# Patient Record
Sex: FEMALE | Race: BLACK OR AFRICAN AMERICAN | NOT HISPANIC OR LATINO | URBAN - METROPOLITAN AREA
[De-identification: names, ages, dates, MRNs, and addresses within clinical notes are randomized per-mention and may not be internally consistent; named-entity substitution may affect disease eponyms.]

---

## 2020-07-31 ENCOUNTER — OFFICE VISIT (OUTPATIENT)
Dept: URBAN - METROPOLITAN AREA CLINIC 92 | Facility: CLINIC | Age: 70
End: 2020-07-31
Payer: MEDICARE

## 2020-07-31 VITALS
HEIGHT: 66 IN | BODY MASS INDEX: 24.59 KG/M2 | SYSTOLIC BLOOD PRESSURE: 120 MMHG | TEMPERATURE: 96.9 F | DIASTOLIC BLOOD PRESSURE: 77 MMHG | WEIGHT: 153 LBS | HEART RATE: 57 BPM

## 2020-07-31 DIAGNOSIS — K29.70 HELICOBACTER POSITIVE GASTRITIS: ICD-10-CM

## 2020-07-31 DIAGNOSIS — K57.90 DIVERTICULOSIS: ICD-10-CM

## 2020-07-31 DIAGNOSIS — Z80.0 FAMILY HISTORY OF COLON CANCER: ICD-10-CM

## 2020-07-31 DIAGNOSIS — Z86.010 HISTORY OF COLON POLYPS: ICD-10-CM

## 2020-07-31 PROCEDURE — G8427 DOCREV CUR MEDS BY ELIG CLIN: HCPCS | Performed by: INTERNAL MEDICINE

## 2020-07-31 PROCEDURE — 99214 OFFICE O/P EST MOD 30 MIN: CPT | Performed by: INTERNAL MEDICINE

## 2020-07-31 PROCEDURE — 1036F TOBACCO NON-USER: CPT | Performed by: INTERNAL MEDICINE

## 2020-07-31 PROCEDURE — 3017F COLORECTAL CA SCREEN DOC REV: CPT | Performed by: INTERNAL MEDICINE

## 2020-07-31 RX ORDER — LINACLOTIDE 145 UG/1
TAKE 1 CAPSULE (145 MCG) BY ORAL ROUTE ONCE DAILY ON AN EMPTY STOMACH AT LEAST 30 MINUTES BEFORE 1ST MEAL OF THE DAY FOR 90 DAYS CAPSULE, GELATIN COATED ORAL 1
Qty: 90 | Refills: 3 | Status: ACTIVE | COMMUNITY
Start: 2020-01-09 | End: 2021-01-03

## 2020-07-31 RX ORDER — HYDROCORTISONE ACETATE 25 MG/1
1 SUPPOSITORY SUPPOSITORY RECTAL TWICE A DAY
Qty: 60 | Refills: 0 | OUTPATIENT
Start: 2020-07-31

## 2020-07-31 RX ORDER — ZOLPIDEM TARTRATE 10 MG/1
TABLET, FILM COATED ORAL
Qty: 0 | Refills: 0 | Status: ACTIVE | COMMUNITY
Start: 1900-01-01

## 2020-07-31 RX ORDER — AMLODIPINE BESYLATE AND BENAZEPRIL HYDROCHLORIDE 5; 20 MG/1; MG/1
TAKE 1 CAPSULE BY ORAL ROUTE ONCE DAILY CAPSULE ORAL 1
Qty: 0 | Refills: 0 | Status: ACTIVE | COMMUNITY
Start: 1900-01-01

## 2020-07-31 RX ORDER — PANTOPRAZOLE SODIUM 40 MG/1
GRANULE, DELAYED RELEASE ORAL
Qty: 0 | Refills: 0 | Status: ACTIVE | COMMUNITY
Start: 1900-01-01

## 2020-07-31 RX ORDER — FAMOTIDINE 40 MG/1
TAKE 1 TABLET (40 MG) BY ORAL ROUTE ONCE DAILY AT BEDTIME FOR 90 DAYS TABLET, FILM COATED ORAL 1
Qty: 90 | Refills: 3 | Status: ACTIVE | COMMUNITY
Start: 2020-04-20 | End: 2021-04-15

## 2020-07-31 RX ORDER — DICLOFENAC SODIUM 75 MG/1
TAKE 1 TABLET (75 MG) BY ORAL ROUTE 2 TIMES PER DAY TABLET, DELAYED RELEASE ORAL 2
Qty: 0 | Refills: 0 | Status: ACTIVE | COMMUNITY
Start: 1900-01-01

## 2020-07-31 RX ORDER — LEVOTHYROXINE SODIUM 50 UG/1
TABLET ORAL
Qty: 0 | Refills: 0 | Status: ACTIVE | COMMUNITY
Start: 1900-01-01

## 2020-07-31 RX ORDER — METOPROLOL SUCCINATE 100 MG/1
TABLET, EXTENDED RELEASE ORAL
Qty: 0 | Refills: 0 | Status: ACTIVE | COMMUNITY
Start: 1900-01-01

## 2020-07-31 RX ORDER — MONTELUKAST SODIUM 10 MG/1
TAKE 1 TABLET (10 MG) BY ORAL ROUTE ONCE DAILY IN THE EVENING TABLET, FILM COATED ORAL 1
Qty: 0 | Refills: 0 | Status: ACTIVE | COMMUNITY
Start: 1900-01-01

## 2020-07-31 NOTE — HPI-TODAY'S VISIT:
This is a 70-year-old female who now presents for an urgent visit for evaluation of rectal pain and bleeding.  Patient reports having pain with defecation associated with BRBPR.  She has tried assessment  With partial improvement.  She reports having a daily bowel movement and denies any abdominal pain or N/V.  She has been followed by Cristel Amato.  She underwent a colonoscopy on 02/21/2020 that demonstrated 7 tubular adenomas, diverticulosis in the right colon, and external/internal hemorrhoids.  She has a family history with her mother, aunt, and grandfather who had colorectal malignancy.  She has intermittent dysphagia and underwent EGD on 02/21/2020 which showed normal esophagus which was empirically dilated.  She was found to have H pylori gastritis.  She was treated with triple therapy.

## 2020-07-31 NOTE — PHYSICAL EXAM GASTROINTESTINAL
Abdomen,  soft, nontender, nondistended,  no guarding or rigidity,  no masses palpable,  normal bowel sounds,  Liver and Spleen,  no hepatomegaly present,  no hepatosplenomegaly,  liver nontender,  spleen not palpable,  Self Exam: Abdomen soft, non-tender to palpatation, non-distended.  CHEKO - non thrombosed external hemorrhoids.  There is a palpable fissure laterally without mass.  There is no blood on the glove.

## 2020-08-03 ENCOUNTER — TELEPHONE ENCOUNTER (OUTPATIENT)
Dept: URBAN - METROPOLITAN AREA CLINIC 92 | Facility: CLINIC | Age: 70
End: 2020-08-03

## 2020-08-26 ENCOUNTER — OFFICE VISIT (OUTPATIENT)
Dept: URBAN - METROPOLITAN AREA CLINIC 92 | Facility: CLINIC | Age: 70
End: 2020-08-26
Payer: MEDICARE

## 2020-08-26 VITALS
WEIGHT: 154.6 LBS | TEMPERATURE: 94.6 F | DIASTOLIC BLOOD PRESSURE: 82 MMHG | HEIGHT: 66 IN | BODY MASS INDEX: 24.85 KG/M2 | SYSTOLIC BLOOD PRESSURE: 133 MMHG | HEART RATE: 55 BPM

## 2020-08-26 DIAGNOSIS — Z80.0 FAMILY HISTORY OF COLON CANCER: ICD-10-CM

## 2020-08-26 DIAGNOSIS — K29.70 HELICOBACTER POSITIVE GASTRITIS: ICD-10-CM

## 2020-08-26 DIAGNOSIS — K57.90 DIVERTICULOSIS: ICD-10-CM

## 2020-08-26 DIAGNOSIS — K59.09 CHRONIC CONSTIPATION: ICD-10-CM

## 2020-08-26 DIAGNOSIS — Z86.010 HISTORY OF COLON POLYPS: ICD-10-CM

## 2020-08-26 DIAGNOSIS — K60.2 ANAL FISSURE: ICD-10-CM

## 2020-08-26 PROCEDURE — 3017F COLORECTAL CA SCREEN DOC REV: CPT | Performed by: PHYSICIAN ASSISTANT

## 2020-08-26 PROCEDURE — 99214 OFFICE O/P EST MOD 30 MIN: CPT | Performed by: PHYSICIAN ASSISTANT

## 2020-08-26 PROCEDURE — G8420 CALC BMI NORM PARAMETERS: HCPCS | Performed by: PHYSICIAN ASSISTANT

## 2020-08-26 PROCEDURE — G8427 DOCREV CUR MEDS BY ELIG CLIN: HCPCS | Performed by: PHYSICIAN ASSISTANT

## 2020-08-26 PROCEDURE — G9903 PT SCRN TBCO ID AS NON USER: HCPCS | Performed by: PHYSICIAN ASSISTANT

## 2020-08-26 RX ORDER — LEVOTHYROXINE SODIUM 50 UG/1
TABLET ORAL
Qty: 0 | Refills: 0 | Status: ACTIVE | COMMUNITY
Start: 1900-01-01

## 2020-08-26 RX ORDER — AMLODIPINE BESYLATE AND BENAZEPRIL HYDROCHLORIDE 5; 20 MG/1; MG/1
TAKE 1 CAPSULE BY ORAL ROUTE ONCE DAILY CAPSULE ORAL 1
Qty: 0 | Refills: 0 | Status: ACTIVE | COMMUNITY
Start: 1900-01-01

## 2020-08-26 RX ORDER — MONTELUKAST SODIUM 10 MG/1
TAKE 1 TABLET (10 MG) BY ORAL ROUTE ONCE DAILY IN THE EVENING TABLET, FILM COATED ORAL 1
Qty: 0 | Refills: 0 | Status: ACTIVE | COMMUNITY
Start: 1900-01-01

## 2020-08-26 RX ORDER — LUBIPROSTONE 24 UG/1
1 CAPSULE WITH FOOD AND WATER CAPSULE, GELATIN COATED ORAL TWICE A DAY
Qty: 60 CAPSULE | Refills: 1 | OUTPATIENT
Start: 2020-08-26 | End: 2020-10-25

## 2020-08-26 RX ORDER — DICLOFENAC SODIUM 75 MG/1
TAKE 1 TABLET (75 MG) BY ORAL ROUTE 2 TIMES PER DAY TABLET, DELAYED RELEASE ORAL 2
Qty: 0 | Refills: 0 | Status: ACTIVE | COMMUNITY
Start: 1900-01-01

## 2020-08-26 RX ORDER — PANTOPRAZOLE SODIUM 40 MG/1
GRANULE, DELAYED RELEASE ORAL
Qty: 0 | Refills: 0 | Status: ACTIVE | COMMUNITY
Start: 1900-01-01

## 2020-08-26 RX ORDER — ZOLPIDEM TARTRATE 10 MG/1
TABLET, FILM COATED ORAL
Qty: 0 | Refills: 0 | Status: ACTIVE | COMMUNITY
Start: 1900-01-01

## 2020-08-26 RX ORDER — LINACLOTIDE 145 UG/1
TAKE 1 CAPSULE (145 MCG) BY ORAL ROUTE ONCE DAILY ON AN EMPTY STOMACH AT LEAST 30 MINUTES BEFORE 1ST MEAL OF THE DAY FOR 90 DAYS CAPSULE, GELATIN COATED ORAL 1
Qty: 90 | Refills: 3 | Status: ACTIVE | COMMUNITY
Start: 2020-01-09 | End: 2021-01-03

## 2020-08-26 RX ORDER — METOPROLOL SUCCINATE 100 MG/1
TABLET, EXTENDED RELEASE ORAL
Qty: 0 | Refills: 0 | Status: ACTIVE | COMMUNITY
Start: 1900-01-01

## 2020-08-26 RX ORDER — FAMOTIDINE 40 MG/1
TAKE 1 TABLET (40 MG) BY ORAL ROUTE ONCE DAILY AT BEDTIME FOR 90 DAYS TABLET, FILM COATED ORAL 1
Qty: 90 | Refills: 3 | Status: ACTIVE | COMMUNITY
Start: 2020-04-20 | End: 2021-04-15

## 2020-08-26 RX ORDER — HYDROCORTISONE ACETATE 25 MG/1
1 SUPPOSITORY SUPPOSITORY RECTAL TWICE A DAY
Qty: 60 | Refills: 0 | Status: ACTIVE | COMMUNITY
Start: 2020-07-31

## 2020-08-26 NOTE — HPI-TODAY'S VISIT:
This is a 70yoF who now presents for f/u of rectal pain and bleeding.   Seen emergently by Dr. Coates 3 weeks ago and dx with fissure, given lido/nifedipine and stool softners.  She feels some incomplete evacuation despite miralax and thinks this is aggravating her pain. Pain has improved but only slightly. Bleeding still but not with every BM.   She underwent a colonoscopy on 02/21/2020 that demonstrated 7 tubular adenomas, diverticulosis in the right colon, and external/internal hemorrhoids.  She has a family history with her mother, aunt, and grandfather who had colorectal malignancy.  She has intermittent dysphagia and underwent EGD on 02/21/2020 which showed normal esophagus which was empirically dilated.  She was found to have H pylori gastritis.  She was treated with triple therapy. She is taking her PPI every AM and heartburn has resolved.

## 2020-09-25 ENCOUNTER — OFFICE VISIT (OUTPATIENT)
Dept: URBAN - METROPOLITAN AREA TELEHEALTH 2 | Facility: TELEHEALTH | Age: 70
End: 2020-09-25
Payer: MEDICARE

## 2020-09-25 DIAGNOSIS — K60.2 ANAL FISSURE: ICD-10-CM

## 2020-09-25 DIAGNOSIS — Z86.010 HISTORY OF COLON POLYPS: ICD-10-CM

## 2020-09-25 DIAGNOSIS — K57.30 ACQUIRED DIVERTICULOSIS OF COLON: ICD-10-CM

## 2020-09-25 DIAGNOSIS — K59.09 CHRONIC CONSTIPATION: ICD-10-CM

## 2020-09-25 DIAGNOSIS — K57.90 DIVERTICULOSIS: ICD-10-CM

## 2020-09-25 DIAGNOSIS — Z80.0 FAMILY HISTORY OF COLON CANCER: ICD-10-CM

## 2020-09-25 DIAGNOSIS — K29.70 HELICOBACTER POSITIVE GASTRITIS: ICD-10-CM

## 2020-09-25 PROCEDURE — G8427 DOCREV CUR MEDS BY ELIG CLIN: HCPCS | Performed by: PHYSICIAN ASSISTANT

## 2020-09-25 PROCEDURE — G8420 CALC BMI NORM PARAMETERS: HCPCS | Performed by: PHYSICIAN ASSISTANT

## 2020-09-25 PROCEDURE — 3017F COLORECTAL CA SCREEN DOC REV: CPT | Performed by: PHYSICIAN ASSISTANT

## 2020-09-25 PROCEDURE — 1036F TOBACCO NON-USER: CPT | Performed by: PHYSICIAN ASSISTANT

## 2020-09-25 PROCEDURE — G9903 PT SCRN TBCO ID AS NON USER: HCPCS | Performed by: PHYSICIAN ASSISTANT

## 2020-09-25 PROCEDURE — 99214 OFFICE O/P EST MOD 30 MIN: CPT | Performed by: PHYSICIAN ASSISTANT

## 2020-09-25 RX ORDER — LEVOTHYROXINE SODIUM 50 UG/1
TABLET ORAL
Qty: 0 | Refills: 0 | Status: ACTIVE | COMMUNITY
Start: 1900-01-01

## 2020-09-25 RX ORDER — PANTOPRAZOLE SODIUM 40 MG/1
GRANULE, DELAYED RELEASE ORAL
Qty: 0 | Refills: 0 | Status: ACTIVE | COMMUNITY
Start: 1900-01-01

## 2020-09-25 RX ORDER — LUBIPROSTONE 24 UG/1
1 CAPSULE WITH FOOD AND WATER CAPSULE, GELATIN COATED ORAL TWICE A DAY
Qty: 60 CAPSULE | Refills: 1 | Status: DISCONTINUED | COMMUNITY
Start: 2020-08-26 | End: 2020-10-25

## 2020-09-25 RX ORDER — ZOLPIDEM TARTRATE 10 MG/1
TABLET, FILM COATED ORAL
Qty: 0 | Refills: 0 | Status: ACTIVE | COMMUNITY
Start: 1900-01-01

## 2020-09-25 RX ORDER — METOPROLOL SUCCINATE 100 MG/1
TABLET, EXTENDED RELEASE ORAL
Qty: 0 | Refills: 0 | Status: ACTIVE | COMMUNITY
Start: 1900-01-01

## 2020-09-25 RX ORDER — DICLOFENAC SODIUM 75 MG/1
TAKE 1 TABLET (75 MG) BY ORAL ROUTE 2 TIMES PER DAY TABLET, DELAYED RELEASE ORAL 2
Qty: 0 | Refills: 0 | Status: ACTIVE | COMMUNITY
Start: 1900-01-01

## 2020-09-25 RX ORDER — PLECANATIDE 3 MG/1
1 TABLET TABLET ORAL ONCE A DAY
Qty: 90 | Refills: 3 | OUTPATIENT
Start: 2020-09-25 | End: 2021-09-20

## 2020-09-25 RX ORDER — AMLODIPINE BESYLATE AND BENAZEPRIL HYDROCHLORIDE 5; 20 MG/1; MG/1
TAKE 1 CAPSULE BY ORAL ROUTE ONCE DAILY CAPSULE ORAL 1
Qty: 0 | Refills: 0 | Status: ACTIVE | COMMUNITY
Start: 1900-01-01

## 2020-09-25 RX ORDER — FAMOTIDINE 40 MG/1
TAKE 1 TABLET (40 MG) BY ORAL ROUTE ONCE DAILY AT BEDTIME FOR 90 DAYS TABLET, FILM COATED ORAL 1
Qty: 90 | Refills: 3 | Status: ACTIVE | COMMUNITY
Start: 2020-04-20 | End: 2021-04-15

## 2020-09-25 RX ORDER — LINACLOTIDE 145 UG/1
TAKE 1 CAPSULE (145 MCG) BY ORAL ROUTE ONCE DAILY ON AN EMPTY STOMACH AT LEAST 30 MINUTES BEFORE 1ST MEAL OF THE DAY FOR 90 DAYS CAPSULE, GELATIN COATED ORAL 1
Qty: 90 | Refills: 3 | Status: DISCONTINUED | COMMUNITY
Start: 2020-01-09 | End: 2021-01-03

## 2020-09-25 RX ORDER — MONTELUKAST SODIUM 10 MG/1
TAKE 1 TABLET (10 MG) BY ORAL ROUTE ONCE DAILY IN THE EVENING TABLET, FILM COATED ORAL 1
Qty: 0 | Refills: 0 | Status: ACTIVE | COMMUNITY
Start: 1900-01-01

## 2020-09-25 RX ORDER — HYDROCORTISONE ACETATE 25 MG/1
1 SUPPOSITORY SUPPOSITORY RECTAL TWICE A DAY
Qty: 60 | Refills: 0 | Status: ACTIVE | COMMUNITY
Start: 2020-07-31

## 2020-09-25 NOTE — HPI-TODAY'S VISIT:
This is a 70yoF who now presents for f/u of rectal pain and bleeding.      Seen emergently by Dr. Coates 6 weeks ago and dx with fissure, given lido/nifedipine and stool softners.  Pain and bleeding had improved sign--pain only with BM and more mild and bleeding only with BM and less often. Continues to use cream and sitz baths. Linzess 145 caused diarrhea and cramps and amitiza 24mcg BID did not help. Using miralax BID but still some straining and uses linzess when feels backed up.  She underwent a colonoscopy on 02/21/2020 that demonstrated 7 tubular adenomas, diverticulosis in the right colon, and external/internal hemorrhoids.  She has a family history with her mother, aunt, and grandfather who had colorectal malignancy.  She has intermittent dysphagia and underwent EGD on 02/21/2020 which showed normal esophagus which was empirically dilated.  She was found to have H pylori gastritis.  She was treated with triple therapy. She is taking her PPI every AM and heartburn has resolved.

## 2020-11-04 ENCOUNTER — OFFICE VISIT (OUTPATIENT)
Dept: URBAN - METROPOLITAN AREA CLINIC 92 | Facility: CLINIC | Age: 70
End: 2020-11-04

## 2020-11-30 ENCOUNTER — OFFICE VISIT (OUTPATIENT)
Dept: URBAN - METROPOLITAN AREA TELEHEALTH 2 | Facility: TELEHEALTH | Age: 70
End: 2020-11-30
Payer: MEDICARE

## 2020-11-30 DIAGNOSIS — K29.60 ADENOPAPILLOMATOSIS GASTRICA: ICD-10-CM

## 2020-11-30 DIAGNOSIS — K57.90 DIVERTICULOSIS: ICD-10-CM

## 2020-11-30 DIAGNOSIS — Z80.0 FAMILY HISTORY OF COLON CANCER: ICD-10-CM

## 2020-11-30 DIAGNOSIS — K60.2 ANAL FISSURE: ICD-10-CM

## 2020-11-30 DIAGNOSIS — K29.70 HELICOBACTER POSITIVE GASTRITIS: ICD-10-CM

## 2020-11-30 DIAGNOSIS — Z86.010 HISTORY OF COLON POLYPS: ICD-10-CM

## 2020-11-30 DIAGNOSIS — K59.09 CHRONIC CONSTIPATION: ICD-10-CM

## 2020-11-30 PROCEDURE — G9903 PT SCRN TBCO ID AS NON USER: HCPCS | Performed by: INTERNAL MEDICINE

## 2020-11-30 PROCEDURE — G8427 DOCREV CUR MEDS BY ELIG CLIN: HCPCS | Performed by: INTERNAL MEDICINE

## 2020-11-30 PROCEDURE — 1036F TOBACCO NON-USER: CPT | Performed by: INTERNAL MEDICINE

## 2020-11-30 PROCEDURE — G8420 CALC BMI NORM PARAMETERS: HCPCS | Performed by: INTERNAL MEDICINE

## 2020-11-30 PROCEDURE — G8482 FLU IMMUNIZE ORDER/ADMIN: HCPCS | Performed by: INTERNAL MEDICINE

## 2020-11-30 PROCEDURE — 99213 OFFICE O/P EST LOW 20 MIN: CPT | Performed by: INTERNAL MEDICINE

## 2020-11-30 RX ORDER — METOPROLOL SUCCINATE 100 MG/1
TABLET, EXTENDED RELEASE ORAL
Qty: 0 | Refills: 0 | Status: ACTIVE | COMMUNITY
Start: 1900-01-01

## 2020-11-30 RX ORDER — ZOLPIDEM TARTRATE 10 MG/1
TABLET, FILM COATED ORAL
Qty: 0 | Refills: 0 | Status: ACTIVE | COMMUNITY
Start: 1900-01-01

## 2020-11-30 RX ORDER — LEVOTHYROXINE SODIUM 50 UG/1
TABLET ORAL
Qty: 0 | Refills: 0 | Status: ACTIVE | COMMUNITY
Start: 1900-01-01

## 2020-11-30 RX ORDER — HYDROCORTISONE ACETATE 25 MG/1
1 SUPPOSITORY SUPPOSITORY RECTAL TWICE A DAY
Qty: 60 | Refills: 0 | Status: ACTIVE | COMMUNITY
Start: 2020-07-31

## 2020-11-30 RX ORDER — PLECANATIDE 3 MG/1
1 TABLET TABLET ORAL ONCE A DAY
Qty: 90 | Refills: 3 | OUTPATIENT

## 2020-11-30 RX ORDER — AMLODIPINE BESYLATE AND BENAZEPRIL HYDROCHLORIDE 5; 20 MG/1; MG/1
TAKE 1 CAPSULE BY ORAL ROUTE ONCE DAILY CAPSULE ORAL 1
Qty: 0 | Refills: 0 | Status: ACTIVE | COMMUNITY
Start: 1900-01-01

## 2020-11-30 RX ORDER — MONTELUKAST SODIUM 10 MG/1
TAKE 1 TABLET (10 MG) BY ORAL ROUTE ONCE DAILY IN THE EVENING TABLET, FILM COATED ORAL 1
Qty: 0 | Refills: 0 | Status: ACTIVE | COMMUNITY
Start: 1900-01-01

## 2020-11-30 RX ORDER — PLECANATIDE 3 MG/1
1 TABLET TABLET ORAL ONCE A DAY
Qty: 90 | Refills: 3 | Status: ACTIVE | COMMUNITY
Start: 2020-09-25 | End: 2021-09-20

## 2020-11-30 RX ORDER — FAMOTIDINE 40 MG/1
TAKE 1 TABLET (40 MG) BY ORAL ROUTE ONCE DAILY AT BEDTIME FOR 90 DAYS TABLET, FILM COATED ORAL 1
Qty: 90 | Refills: 3 | Status: ACTIVE | COMMUNITY
Start: 2020-04-20 | End: 2021-04-15

## 2020-11-30 RX ORDER — PANTOPRAZOLE SODIUM 40 MG/1
GRANULE, DELAYED RELEASE ORAL
Qty: 0 | Refills: 0 | Status: ACTIVE | COMMUNITY
Start: 1900-01-01

## 2020-11-30 RX ORDER — DICLOFENAC SODIUM 75 MG/1
TAKE 1 TABLET (75 MG) BY ORAL ROUTE 2 TIMES PER DAY TABLET, DELAYED RELEASE ORAL 2
Qty: 0 | Refills: 0 | Status: ACTIVE | COMMUNITY
Start: 1900-01-01

## 2020-11-30 NOTE — HPI-TODAY'S VISIT:
This is a 70yoF who now presents for f/u of rectal pain and bleeding.        Seen emergently by Dr. Coates 3m ago and dx with fissure, given lido/nifedipine and stool softners.  Pain has resolved and bleeding very rare, only with straining.  Linzess 145 caused diarrhea and cramps and amitiza 24mcg BID did not help. Using miralax 1-BID and prn laxatives. Given trulane at last OV but needs PA.   She underwent a colonoscopy on 02/21/2020 that demonstrated 7 tubular adenomas, diverticulosis in the right colon, and external/internal hemorrhoids.  She has a family history with her mother, aunt, and grandfather who had colorectal malignancy.  She has intermittent dysphagia and underwent EGD on 02/21/2020 which showed normal esophagus which was empirically dilated.  She was found to have H pylori gastritis.  She was treated with triple therapy. She is taking her PPI every AM and heartburn has resolved.

## 2022-03-23 ENCOUNTER — WEB ENCOUNTER (OUTPATIENT)
Dept: URBAN - METROPOLITAN AREA CLINIC 92 | Facility: CLINIC | Age: 72
End: 2022-03-23

## 2022-03-23 ENCOUNTER — OFFICE VISIT (OUTPATIENT)
Dept: URBAN - METROPOLITAN AREA CLINIC 92 | Facility: CLINIC | Age: 72
End: 2022-03-23
Payer: MEDICARE

## 2022-03-23 ENCOUNTER — TELEPHONE ENCOUNTER (OUTPATIENT)
Dept: URBAN - METROPOLITAN AREA CLINIC 92 | Facility: CLINIC | Age: 72
End: 2022-03-23

## 2022-03-23 VITALS
HEIGHT: 66 IN | DIASTOLIC BLOOD PRESSURE: 85 MMHG | WEIGHT: 150 LBS | SYSTOLIC BLOOD PRESSURE: 131 MMHG | BODY MASS INDEX: 24.11 KG/M2 | HEART RATE: 53 BPM | TEMPERATURE: 96.1 F

## 2022-03-23 DIAGNOSIS — K60.2 ANAL FISSURE: ICD-10-CM

## 2022-03-23 DIAGNOSIS — Z86.010 HISTORY OF COLON POLYPS: ICD-10-CM

## 2022-03-23 DIAGNOSIS — K62.5 RECTAL BLEEDING: ICD-10-CM

## 2022-03-23 DIAGNOSIS — K59.09 CHRONIC CONSTIPATION: ICD-10-CM

## 2022-03-23 DIAGNOSIS — K57.90 DIVERTICULOSIS: ICD-10-CM

## 2022-03-23 DIAGNOSIS — Z80.0 FAMILY HISTORY OF COLON CANCER: ICD-10-CM

## 2022-03-23 DIAGNOSIS — K29.70 HELICOBACTER POSITIVE GASTRITIS: ICD-10-CM

## 2022-03-23 PROCEDURE — 99214 OFFICE O/P EST MOD 30 MIN: CPT | Performed by: PHYSICIAN ASSISTANT

## 2022-03-23 RX ORDER — HYDROCORTISONE ACETATE 25 MG/1
1 SUPPOSITORY SUPPOSITORY RECTAL TWICE A DAY
Qty: 60 | Refills: 0 | Status: ON HOLD | COMMUNITY
Start: 2020-07-31

## 2022-03-23 RX ORDER — DICLOFENAC SODIUM 75 MG/1
TAKE 1 TABLET (75 MG) BY ORAL ROUTE 2 TIMES PER DAY TABLET, DELAYED RELEASE ORAL 2
Qty: 0 | Refills: 0 | Status: ON HOLD | COMMUNITY
Start: 1900-01-01

## 2022-03-23 RX ORDER — PLECANATIDE 3 MG/1
1 TABLET TABLET ORAL ONCE A DAY
Qty: 90 | Refills: 3 | OUTPATIENT

## 2022-03-23 RX ORDER — LEVOTHYROXINE SODIUM 50 UG/1
TABLET ORAL
Qty: 0 | Refills: 0 | Status: ACTIVE | COMMUNITY
Start: 1900-01-01

## 2022-03-23 RX ORDER — METOPROLOL SUCCINATE 100 MG/1
TABLET, EXTENDED RELEASE ORAL
Qty: 0 | Refills: 0 | Status: ACTIVE | COMMUNITY
Start: 1900-01-01

## 2022-03-23 RX ORDER — PANTOPRAZOLE SODIUM 40 MG/1
GRANULE, DELAYED RELEASE ORAL
Qty: 0 | Refills: 0 | Status: ACTIVE | COMMUNITY
Start: 1900-01-01

## 2022-03-23 RX ORDER — PLECANATIDE 3 MG/1
1 TABLET TABLET ORAL ONCE A DAY
Qty: 90 | Refills: 3 | Status: ON HOLD | COMMUNITY

## 2022-03-23 RX ORDER — AMLODIPINE BESYLATE AND BENAZEPRIL HYDROCHLORIDE 5; 20 MG/1; MG/1
TAKE 1 CAPSULE BY ORAL ROUTE ONCE DAILY CAPSULE ORAL 1
Qty: 0 | Refills: 0 | Status: ACTIVE | COMMUNITY
Start: 1900-01-01

## 2022-03-23 RX ORDER — ZOLPIDEM TARTRATE 10 MG/1
TABLET, FILM COATED ORAL
Qty: 0 | Refills: 0 | Status: ON HOLD | COMMUNITY
Start: 1900-01-01

## 2022-03-23 RX ORDER — MONTELUKAST SODIUM 10 MG/1
TAKE 1 TABLET (10 MG) BY ORAL ROUTE ONCE DAILY IN THE EVENING TABLET, FILM COATED ORAL 1
Qty: 0 | Refills: 0 | Status: ACTIVE | COMMUNITY
Start: 1900-01-01

## 2022-03-23 NOTE — EXAM-PHYSICAL EXAM
rectal: large external hemm, not bleeding + pain on posterior anal area m/l 2' fissue, exam not complete 2' pain

## 2022-03-23 NOTE — HPI-TODAY'S VISIT:
This is a 72yoF who now presents for eval of rectal bleeding, last seen 8/2020.    Notes intermittent rectal bleeding with associated rectal soreness X 1 year. Bleeding was daily in the winter of 2021, then resolved for months and now has been recurring multiple times a week. She  has a hx of CIC and is currently using miralax 3 days/week and has BMs daily but admits to straining at times and some hard stools. Linzess caused cramps and diarrhea and amitiza did not help. Never tried rx sent for trulance.      She was dx with an anal fissure in 2020, given lido/nifedipine and stool softners.   She underwent a colonoscopy on 02/21/2020 that demonstrated 7 tubular adenomas, diverticulosis in the right colon, and external/internal hemorrhoids.  She has a family history with her mother, aunt, and grandfather who had colorectal malignancy.  She has intermittent dysphagia and underwent EGD on 02/21/2020 which showed normal esophagus which was empirically dilated.  She was found to have H pylori gastritis.  She was treated with triple therapy but eradication testing was not completed. She is taking her PPI every AM and heartburn has resolved bust still has some regurgitation, worse with leaning and pending over. No dysphagia

## 2022-04-11 ENCOUNTER — LAB OUTSIDE AN ENCOUNTER (OUTPATIENT)
Dept: URBAN - METROPOLITAN AREA CLINIC 92 | Facility: CLINIC | Age: 72
End: 2022-04-11

## 2022-04-12 LAB — H PYLORI BREATH TEST: NEGATIVE

## 2022-07-21 ENCOUNTER — WEB ENCOUNTER (OUTPATIENT)
Dept: URBAN - METROPOLITAN AREA CLINIC 92 | Facility: CLINIC | Age: 72
End: 2022-07-21

## 2022-07-21 ENCOUNTER — OFFICE VISIT (OUTPATIENT)
Dept: URBAN - METROPOLITAN AREA CLINIC 92 | Facility: CLINIC | Age: 72
End: 2022-07-21
Payer: MEDICARE

## 2022-07-21 VITALS
HEART RATE: 52 BPM | WEIGHT: 151 LBS | TEMPERATURE: 95.7 F | DIASTOLIC BLOOD PRESSURE: 85 MMHG | BODY MASS INDEX: 24.27 KG/M2 | SYSTOLIC BLOOD PRESSURE: 141 MMHG | HEIGHT: 66 IN

## 2022-07-21 DIAGNOSIS — K57.50 DIVERTICULOSIS OF BOTH SMALL AND LARGE INTESTINE WITHOUT PERFORATION OR ABSCESS: ICD-10-CM

## 2022-07-21 DIAGNOSIS — K60.2 ANAL FISSURE: ICD-10-CM

## 2022-07-21 DIAGNOSIS — Z86.010 HISTORY OF COLON POLYPS: ICD-10-CM

## 2022-07-21 DIAGNOSIS — Z80.0 FAMILY HISTORY OF COLON CANCER: ICD-10-CM

## 2022-07-21 PROBLEM — 236069009: Status: ACTIVE | Noted: 2020-08-26

## 2022-07-21 PROCEDURE — 99213 OFFICE O/P EST LOW 20 MIN: CPT | Performed by: PHYSICIAN ASSISTANT

## 2022-07-21 RX ORDER — DICLOFENAC SODIUM 75 MG/1
TAKE 1 TABLET (75 MG) BY ORAL ROUTE 2 TIMES PER DAY TABLET, DELAYED RELEASE ORAL 2
Qty: 0 | Refills: 0 | Status: ON HOLD | COMMUNITY
Start: 1900-01-01

## 2022-07-21 RX ORDER — ZOLPIDEM TARTRATE 10 MG/1
TABLET, FILM COATED ORAL
Qty: 0 | Refills: 0 | Status: ON HOLD | COMMUNITY
Start: 1900-01-01

## 2022-07-21 RX ORDER — MONTELUKAST SODIUM 10 MG/1
TAKE 1 TABLET (10 MG) BY ORAL ROUTE ONCE DAILY IN THE EVENING TABLET, FILM COATED ORAL 1
Qty: 0 | Refills: 0 | Status: ACTIVE | COMMUNITY
Start: 1900-01-01

## 2022-07-21 RX ORDER — HYDROCORTISONE ACETATE 25 MG/1
1 SUPPOSITORY SUPPOSITORY RECTAL TWICE A DAY
Qty: 60 | Refills: 0 | Status: ON HOLD | COMMUNITY
Start: 2020-07-31

## 2022-07-21 RX ORDER — METOPROLOL SUCCINATE 100 MG/1
TABLET, EXTENDED RELEASE ORAL
Qty: 0 | Refills: 0 | Status: ACTIVE | COMMUNITY
Start: 1900-01-01

## 2022-07-21 RX ORDER — AMLODIPINE BESYLATE AND BENAZEPRIL HYDROCHLORIDE 5; 20 MG/1; MG/1
TAKE 1 CAPSULE BY ORAL ROUTE ONCE DAILY CAPSULE ORAL 1
Qty: 0 | Refills: 0 | Status: ACTIVE | COMMUNITY
Start: 1900-01-01

## 2022-07-21 RX ORDER — LEVOTHYROXINE SODIUM 50 UG/1
TABLET ORAL
Qty: 0 | Refills: 0 | Status: ACTIVE | COMMUNITY
Start: 1900-01-01

## 2022-07-21 RX ORDER — PANTOPRAZOLE SODIUM 40 MG/1
GRANULE, DELAYED RELEASE ORAL
Qty: 0 | Refills: 0 | Status: ACTIVE | COMMUNITY
Start: 1900-01-01

## 2022-07-21 NOTE — PHYSICAL EXAM GASTROINTESTINAL
Abdomen, LLQ tenderness  soft, nondistended,  no guarding or rigidity,  no masses palpable,  normal bowel sounds Liver and Spleen, no hepatosplenomegaly Rectal deferred

## 2022-07-21 NOTE — HPI-TODAY'S VISIT:
Patient Instructions by Michelle Kay MD at 9/4/2019  9:10 AM     Author: Michelle Kay MD Service: -- Author Type: Physician    Filed: 9/4/2019  9:47 AM Encounter Date: 9/4/2019 Status: Signed    : Michelle Kay MD (Physician)         9/4/2019  Wt Readings from Last 1 Encounters:   09/04/19 21 lb (9.526 kg) (66 %, Z= 0.42)*     * Growth percentiles are based on WHO (Girls, 0-2 years) data.       Acetaminophen Dosing Instructions  (May take every 4-6 hours)      WEIGHT   AGE Infant/Children's  160mg/5ml Children's   Chewable Tabs  80 mg each Kenneth Strength  Chewable Tabs  160 mg     Milliliter (ml) Soft Chew Tabs Chewable Tabs   6-11 lbs 0-3 months 1.25 ml     12-17 lbs 4-11 months 2.5 ml     18-23 lbs 12-23 months 3.75 ml     24-35 lbs 2-3 years 5 ml 2 tabs    36-47 lbs 4-5 years 7.5 ml 3 tabs    48-59 lbs 6-8 years 10 ml 4 tabs 2 tabs   60-71 lbs 9-10 years 12.5 ml 5 tabs 2.5 tabs   72-95 lbs 11 years 15 ml 6 tabs 3 tabs   96 lbs and over 12 years   4 tabs     Ibuprofen Dosing Instructions- Liquid  (May take every 6-8 hours)      WEIGHT   AGE Concentrated Drops   50 mg/1.25 ml Infant/Children's   100 mg/5ml     Dropperful Milliliter (ml)   12-17 lbs 6- 11 months 1 (1.25 ml)    18-23 lbs 12-23 months 1 1/2 (1.875 ml)    24-35 lbs 2-3 years  5 ml   36-47 lbs 4-5 years  7.5 ml   48-59 lbs 6-8 years  10 ml   60-71 lbs 9-10 years  12.5 ml   72-95 lbs 11 years  15 ml       Ibuprofen Dosing Instructions- Tablets/Caplets  (May take every 6-8 hours)    WEIGHT AGE Children's   Chewable Tabs   50 mg Kenneth Strength   Chewable Tabs   100 mg Kenneth Strength   Caplets    100 mg     Tablet Tablet Caplet   24-35 lbs 2-3 years 2 tabs     36-47 lbs 4-5 years 3 tabs     48-59 lbs 6-8 years 4 tabs 2 tabs 2 caps   60-71 lbs 9-10 years 5 tabs 2.5 tabs 2.5 caps   72-95 lbs 11 years 6 tabs 3 tabs 3 caps           Patient Education             Bright Futures Parent Handout   12 Month Visit  Here are some  This is a 72yoF who now presents for follow-up.    She was seen in April with intermittent rectal bleeding with associated rectal soreness X 1 year. She  has a hx of CIC and advised to start daily miralax 2' straining . She had a fissure on exam, given nifedipine/lido and referred to Dr. Orr who was unable to complete anoscopy 2' patients pain. He continued her on nifedipine, and she is still on this with some relief for the pain, but she is having intermittent bleeding and rectal discomfort, improved.  Of note, linzess caused cramps and diarrhea and amitiza did not help. Did not try Trulance due to insurance issues. She has 1-2 BM daily and is currently taking Dulcolax daily which helps some but still having constipation and straining. She is having to pushing on her buttocks to get the stool out.   She underwent a colonoscopy on 02/21/2020 that demonstrated 7 tubular adenomas, diverticulosis in the right colon, and external/internal hemorrhoids.  She has a family history with her mother, aunt, and grandfather who had colorectal malignancy.  She has intermittent dysphagia and underwent EGD on 02/21/2020 which showed normal esophagus which was empirically dilated.  She was found to have H pylori gastritis.  She was treated with triple therapy and had eradication testing 4/2022 which confirmed eradication.  She is taking her PPI every AM and denies GERD, dysphagia, odynophagia and N/V. suggestions from [employer] experts that may be of value to your family     Family Support    Try not to hit, spank, or yell at your child.    Keep rules for your child short and simple.    Use short time-outs when your child is behaving poorly.    Praise your child for good behavior.    Distract your child with something he likes during bad behavior.    Play with and read to your child often.    Make sure everyone who cares for your child gives healthy foods, avoids sweets, and uses the same rules for discipline.    Make sure places your child stays are safe.    Think about joining a toddler playgroup or taking a parenting class.    Take time for yourself and your partner.    Keep in contact with family and friends.  Establishing Routines    Your child should have at least one nap. Space it to make sure your child is tired for bed.    Make the hour before bedtime loving and calm.    Have a simple bedtime routine that includes a book.    Avoid having your child watch TV and videos, and never watch anything scary.    Be aware that fear of strangers is normal and peaks at this age.    Respect your marcy fears and have strangers approach slowly.    Avoid watching TV during family time.    Start family traditions such as reading or going for a walk together. Feeding Your Child    Have your child eat during family mealtime.    Be patient with your child as she learns to eat without help.    Encourage your child to feed herself.    Give 3 meals and 2-3 snacks spaced evenly over the day to avoid tantrums.    Make sure caregivers follow the same ideas and routines for feeding.    Use a small plate and cup for eating and drinking.    Provide healthy foods for meals and snacks.    Let your child decide what and how much to eat.    End the feeding when the child stops eating.    Avoid small, hard foods that can cause choking--nuts, popcorn, hot dogs, grapes, and hard, raw veggies.  Safety    Have your marcy car safety  seat rear-facing until your child is 2 years of age or until she reaches the highest weight or height allowed by the car safety seats .    Lock away poisons, medications, and lawn and cleaning supplies. Call Poison Help (1-814.438.3862) if your child eats nonfoods.    Keep small objects, balloons, and plastic bags away from your child.    Place diaz at the top and bottom of stairs and guards on windows on the second floor and higher. Keep furniture away from windows.    Lock away knives and scissors.    Only leave your toddler with a mature adult.    Near or in water, keep your child close enough to touch.   Make sure to empty buckets, pools, and tubs when done.    Never have a gun in the home. If you must have a gun, store it unloaded and locked with the ammunition locked separately from the gun.  Finding a Dentist    Take your child for a first dental visit by 12 months.    Brush your carl teeth twice each day.    With water only, use a soft toothbrush.    If using a bottle, offer only water.  What to Expect at Your Carl 15 Month Visit  We will talk about    Your carl speech and feelings    Getting a good nights sleep    Keeping your home safe for your child    Temper tantrums and discipline    Caring for your carl teeth  ________________________________  Poison Help: 1-644.347.4639  Child safety seat inspection: 8-341-ZYLNMZHYH; seatcheck.org

## 2022-07-25 PROBLEM — 30037006: Status: ACTIVE | Noted: 2020-08-26

## 2023-02-06 PROBLEM — 312824007 FAMILY HISTORY OF CANCER OF COLON (SITUATION): Status: ACTIVE | Noted: 2020-08-26

## 2023-02-15 ENCOUNTER — TELEPHONE ENCOUNTER (OUTPATIENT)
Dept: URBAN - METROPOLITAN AREA CLINIC 5 | Facility: CLINIC | Age: 73
End: 2023-02-15

## 2023-02-15 PROBLEM — 398050005 DIVERTICULAR DISEASE OF COLON: Status: ACTIVE | Noted: 2020-08-26

## 2023-02-15 PROBLEM — 196731005 GASTRODUODENITIS: Status: ACTIVE | Noted: 2020-08-26

## 2023-02-15 PROBLEM — 266435005: Status: ACTIVE | Noted: 2022-07-21

## 2023-02-15 PROBLEM — 428283002 HISTORY OF POLYP OF COLON: Status: ACTIVE | Noted: 2020-08-26

## 2023-02-21 ENCOUNTER — OFFICE VISIT (OUTPATIENT)
Dept: URBAN - METROPOLITAN AREA TELEHEALTH 2 | Facility: TELEHEALTH | Age: 73
End: 2023-02-21

## 2023-02-21 RX ORDER — DICLOFENAC SODIUM 75 MG/1
TAKE 1 TABLET (75 MG) BY ORAL ROUTE 2 TIMES PER DAY TABLET, DELAYED RELEASE ORAL 2
Qty: 0 | Refills: 0 | COMMUNITY
Start: 1900-01-01

## 2023-02-21 RX ORDER — ZOLPIDEM TARTRATE 10 MG/1
TABLET, FILM COATED ORAL
Qty: 0 | Refills: 0 | COMMUNITY
Start: 1900-01-01

## 2023-02-21 RX ORDER — LEVOTHYROXINE SODIUM 50 UG/1
TABLET ORAL
Qty: 0 | Refills: 0 | COMMUNITY
Start: 1900-01-01

## 2023-02-21 RX ORDER — MONTELUKAST SODIUM 10 MG/1
TAKE 1 TABLET (10 MG) BY ORAL ROUTE ONCE DAILY IN THE EVENING TABLET, FILM COATED ORAL 1
Qty: 0 | Refills: 0 | COMMUNITY
Start: 1900-01-01

## 2023-02-21 RX ORDER — METOPROLOL SUCCINATE 100 MG/1
TABLET, EXTENDED RELEASE ORAL
Qty: 0 | Refills: 0 | COMMUNITY
Start: 1900-01-01

## 2023-02-21 RX ORDER — HYDROCORTISONE ACETATE 25 MG/1
1 SUPPOSITORY SUPPOSITORY RECTAL TWICE A DAY
Qty: 60 | Refills: 0 | COMMUNITY
Start: 2020-07-31

## 2023-02-21 RX ORDER — PANTOPRAZOLE SODIUM 40 MG/1
GRANULE, DELAYED RELEASE ORAL
Qty: 0 | Refills: 0 | COMMUNITY
Start: 1900-01-01

## 2023-02-21 RX ORDER — AMLODIPINE BESYLATE AND BENAZEPRIL HYDROCHLORIDE 5; 20 MG/1; MG/1
TAKE 1 CAPSULE BY ORAL ROUTE ONCE DAILY CAPSULE ORAL 1
Qty: 0 | Refills: 0 | COMMUNITY
Start: 1900-01-01

## 2023-02-21 NOTE — HPI-TODAY'S VISIT:
This is a 72yoF who now presents for follow-up.    She was seen in April with intermittent rectal bleeding with associated rectal soreness X 1 year. She  has a hx of CIC and advised to start daily miralax 2' straining . She had a fissure on exam, given nifedipine/lido and referred to Dr. Orr who was unable to complete anoscopy 2' patients pain. He continued her on nifedipine, and she is still on this with some relief for the pain, but she is having intermittent bleeding and rectal discomfort, improved.  Of note, linzess caused cramps and diarrhea and amitiza did not help. Did not try Trulance due to insurance issues. She has 1-2 BM daily and is currently taking Dulcolax daily which helps some but still having constipation and straining. She is having to pushing on her buttocks to get the stool out.   She underwent a colonoscopy on 02/21/2020 that demonstrated 7 tubular adenomas, diverticulosis in the right colon, and external/internal hemorrhoids.  She has a family history with her mother, aunt, and grandfather who had colorectal malignancy.  She has intermittent dysphagia and underwent EGD on 02/21/2020 which showed normal esophagus which was empirically dilated.  She was found to have H pylori gastritis.  She was treated with triple therapy and had eradication testing 4/2022 which confirmed eradication.  She is taking her PPI every AM and denies GERD, dysphagia, odynophagia and N/V.

## 2023-03-02 ENCOUNTER — OFFICE VISIT (OUTPATIENT)
Dept: URBAN - METROPOLITAN AREA SURGERY CENTER 16 | Facility: SURGERY CENTER | Age: 73
End: 2023-03-02
Payer: MEDICARE

## 2023-03-02 ENCOUNTER — CLAIMS CREATED FROM THE CLAIM WINDOW (OUTPATIENT)
Dept: URBAN - METROPOLITAN AREA CLINIC 4 | Facility: CLINIC | Age: 73
End: 2023-03-02
Payer: MEDICARE

## 2023-03-02 DIAGNOSIS — Z86.010 ADENOMAS PERSONAL HISTORY OF COLONIC POLYPS: ICD-10-CM

## 2023-03-02 DIAGNOSIS — D12.2 ADENOMA OF ASCENDING COLON: ICD-10-CM

## 2023-03-02 DIAGNOSIS — D12.4 ADENOMA OF DESCENDING COLON: ICD-10-CM

## 2023-03-02 DIAGNOSIS — D12.2 BENIGN NEOPLASM OF ASCENDING COLON: ICD-10-CM

## 2023-03-02 DIAGNOSIS — D12.5 ADENOMA OF SIGMOID COLON: ICD-10-CM

## 2023-03-02 PROCEDURE — G8907 PT DOC NO EVENTS ON DISCHARG: HCPCS | Performed by: INTERNAL MEDICINE

## 2023-03-02 PROCEDURE — 45385 COLONOSCOPY W/LESION REMOVAL: CPT | Performed by: INTERNAL MEDICINE

## 2023-03-02 PROCEDURE — 88305 TISSUE EXAM BY PATHOLOGIST: CPT | Performed by: PATHOLOGY

## 2023-03-02 RX ORDER — MONTELUKAST SODIUM 10 MG/1
TAKE 1 TABLET (10 MG) BY ORAL ROUTE ONCE DAILY IN THE EVENING TABLET, FILM COATED ORAL 1
Qty: 0 | Refills: 0 | COMMUNITY
Start: 1900-01-01

## 2023-03-02 RX ORDER — PANTOPRAZOLE SODIUM 40 MG/1
GRANULE, DELAYED RELEASE ORAL
Qty: 0 | Refills: 0 | COMMUNITY
Start: 1900-01-01

## 2023-03-02 RX ORDER — HYDROCORTISONE ACETATE 25 MG/1
1 SUPPOSITORY SUPPOSITORY RECTAL TWICE A DAY
Qty: 60 | Refills: 0 | COMMUNITY
Start: 2020-07-31

## 2023-03-02 RX ORDER — AMLODIPINE BESYLATE AND BENAZEPRIL HYDROCHLORIDE 5; 20 MG/1; MG/1
TAKE 1 CAPSULE BY ORAL ROUTE ONCE DAILY CAPSULE ORAL 1
Qty: 0 | Refills: 0 | COMMUNITY
Start: 1900-01-01

## 2023-03-02 RX ORDER — DICLOFENAC SODIUM 75 MG/1
TAKE 1 TABLET (75 MG) BY ORAL ROUTE 2 TIMES PER DAY TABLET, DELAYED RELEASE ORAL 2
Qty: 0 | Refills: 0 | COMMUNITY
Start: 1900-01-01

## 2023-03-02 RX ORDER — ZOLPIDEM TARTRATE 10 MG/1
TABLET, FILM COATED ORAL
Qty: 0 | Refills: 0 | COMMUNITY
Start: 1900-01-01

## 2023-03-02 RX ORDER — METOPROLOL SUCCINATE 100 MG/1
TABLET, EXTENDED RELEASE ORAL
Qty: 0 | Refills: 0 | COMMUNITY
Start: 1900-01-01

## 2023-03-02 RX ORDER — LEVOTHYROXINE SODIUM 50 UG/1
TABLET ORAL
Qty: 0 | Refills: 0 | COMMUNITY
Start: 1900-01-01

## 2023-03-28 ENCOUNTER — OFFICE VISIT (OUTPATIENT)
Dept: URBAN - METROPOLITAN AREA TELEHEALTH 2 | Facility: TELEHEALTH | Age: 73
End: 2023-03-28
Payer: MEDICARE

## 2023-03-28 ENCOUNTER — TELEPHONE ENCOUNTER (OUTPATIENT)
Dept: URBAN - METROPOLITAN AREA CLINIC 92 | Facility: CLINIC | Age: 73
End: 2023-03-28

## 2023-03-28 VITALS — WEIGHT: 151 LBS | BODY MASS INDEX: 24.27 KG/M2 | HEIGHT: 66 IN

## 2023-03-28 DIAGNOSIS — K60.2 ANAL FISSURE: ICD-10-CM

## 2023-03-28 DIAGNOSIS — K29.60 ADENOPAPILLOMATOSIS GASTRICA: ICD-10-CM

## 2023-03-28 DIAGNOSIS — K59.09 CHRONIC CONSTIPATION: ICD-10-CM

## 2023-03-28 DIAGNOSIS — K57.30 ACQUIRED DIVERTICULOSIS OF COLON: ICD-10-CM

## 2023-03-28 PROCEDURE — 99213 OFFICE O/P EST LOW 20 MIN: CPT | Performed by: PHYSICIAN ASSISTANT

## 2023-03-28 RX ORDER — DICLOFENAC SODIUM 75 MG/1
TAKE 1 TABLET (75 MG) BY ORAL ROUTE 2 TIMES PER DAY TABLET, DELAYED RELEASE ORAL 2
Qty: 0 | Refills: 0 | Status: ACTIVE | COMMUNITY
Start: 1900-01-01

## 2023-03-28 RX ORDER — HYDROCORTISONE ACETATE 25 MG/1
1 SUPPOSITORY SUPPOSITORY RECTAL ONCE A DAY
Qty: 14 | Refills: 2 | OUTPATIENT
Start: 2023-03-28 | End: 2023-05-09

## 2023-03-28 RX ORDER — PANTOPRAZOLE SODIUM 40 MG/1
1 TABLET TABLET, DELAYED RELEASE ORAL ONCE A DAY
Qty: 90 TABLET | Refills: 3 | OUTPATIENT
Start: 2023-03-28

## 2023-03-28 RX ORDER — AMLODIPINE BESYLATE AND BENAZEPRIL HYDROCHLORIDE 5; 20 MG/1; MG/1
TAKE 1 CAPSULE BY ORAL ROUTE ONCE DAILY CAPSULE ORAL 1
Qty: 0 | Refills: 0 | Status: ACTIVE | COMMUNITY
Start: 1900-01-01

## 2023-03-28 RX ORDER — PANTOPRAZOLE SODIUM 40 MG/1
GRANULE, DELAYED RELEASE ORAL
Qty: 0 | Refills: 0 | Status: ACTIVE | COMMUNITY
Start: 1900-01-01

## 2023-03-28 RX ORDER — METOPROLOL SUCCINATE 100 MG/1
TABLET, EXTENDED RELEASE ORAL
Qty: 0 | Refills: 0 | Status: ACTIVE | COMMUNITY
Start: 1900-01-01

## 2023-03-28 RX ORDER — MONTELUKAST SODIUM 10 MG/1
TAKE 1 TABLET (10 MG) BY ORAL ROUTE ONCE DAILY IN THE EVENING TABLET, FILM COATED ORAL 1
Qty: 0 | Refills: 0 | Status: ACTIVE | COMMUNITY
Start: 1900-01-01

## 2023-03-28 RX ORDER — HYDROCORTISONE ACETATE 25 MG/1
1 SUPPOSITORY SUPPOSITORY RECTAL TWICE A DAY
Qty: 60 | Refills: 0 | Status: ACTIVE | COMMUNITY
Start: 2020-07-31

## 2023-03-28 RX ORDER — ZOLPIDEM TARTRATE 10 MG/1
TABLET, FILM COATED ORAL
Qty: 0 | Refills: 0 | Status: ACTIVE | COMMUNITY
Start: 1900-01-01

## 2023-03-28 RX ORDER — LEVOTHYROXINE SODIUM 50 UG/1
TABLET ORAL
Qty: 0 | Refills: 0 | Status: ACTIVE | COMMUNITY
Start: 1900-01-01

## 2023-03-28 NOTE — HPI-TODAY'S VISIT:
This is a 73yoF who now presents for follow-up.    She was seen in April 2022 with intermittent rectal bleeding with associated rectal soreness X 1 year. She  has a hx of CIC and advised to start daily miralax 2' straining . She had a fissure on exam, given nifedipine/lido and referred to Dr. Orr and is s/p sphincterotomy and fissurectomy. She notes her rectal pain has resolved but still with some intermittent BRBPR, mostly on TP. She is using miralax daily and having daily BMs.  Linzess caused cramps and diarrhea and amitiza did not help.  She underwent a colonoscopy on 02/21/2020 that demonstrated 7 tubular adenomas, diverticulosis in the right colon, and external/internal hemorrhoids.  She has a family history with her mother, aunt, and grandfather who had colorectal malignancy. Repeat colon 3/2/23 showed IH, 3 4-6mm adenomas and diverticulosis of sigmoid and ascending.   She has intermittent dysphagia and underwent EGD on 02/21/2020 which showed normal esophagus which was empirically dilated.  She was found to have H pylori gastritis.  She was treated with triple therapy and had eradication testing 4/2022 which confirmed eradication.  She is taking her PPI every AM and denies GERD, dysphagia, odynophagia and N/V

## 2023-03-28 NOTE — PHYSICAL EXAM CONSTITUTIONAL:
pleasant, well nourished, well developed, in no acute distress , normal communication ability
Declined

## 2024-03-29 ENCOUNTER — TELEP (OUTPATIENT)
Dept: URBAN - METROPOLITAN AREA TELEHEALTH 2 | Facility: TELEHEALTH | Age: 74
End: 2024-03-29
Payer: MEDICARE

## 2024-03-29 ENCOUNTER — TELEP (OUTPATIENT)
Dept: URBAN - METROPOLITAN AREA TELEHEALTH 2 | Facility: TELEHEALTH | Age: 74
End: 2024-03-29

## 2024-03-29 VITALS — WEIGHT: 151 LBS | BODY MASS INDEX: 24.27 KG/M2 | HEIGHT: 66 IN

## 2024-03-29 DIAGNOSIS — K57.90 DIVERTICULOSIS: ICD-10-CM

## 2024-03-29 DIAGNOSIS — K62.5 RECTAL BLEEDING: ICD-10-CM

## 2024-03-29 DIAGNOSIS — K60.2 ANAL FISSURE: ICD-10-CM

## 2024-03-29 DIAGNOSIS — K21.9 GERD WITHOUT ESOPHAGITIS: ICD-10-CM

## 2024-03-29 DIAGNOSIS — K29.70 HELICOBACTER POSITIVE GASTRITIS: ICD-10-CM

## 2024-03-29 DIAGNOSIS — K59.09 CHRONIC CONSTIPATION: ICD-10-CM

## 2024-03-29 DIAGNOSIS — Z86.010 HISTORY OF COLON POLYPS: ICD-10-CM

## 2024-03-29 DIAGNOSIS — Z80.0 FAMILY HISTORY OF COLON CANCER: ICD-10-CM

## 2024-03-29 PROCEDURE — 99213 OFFICE O/P EST LOW 20 MIN: CPT | Performed by: PHYSICIAN ASSISTANT

## 2024-03-29 RX ORDER — AMLODIPINE BESYLATE AND BENAZEPRIL HYDROCHLORIDE 5; 20 MG/1; MG/1
TAKE 1 CAPSULE BY ORAL ROUTE ONCE DAILY CAPSULE ORAL 1
Qty: 0 | Refills: 0 | COMMUNITY
Start: 1900-01-01

## 2024-03-29 RX ORDER — PANTOPRAZOLE SODIUM 40 MG/1
1 TABLET TABLET, DELAYED RELEASE ORAL ONCE A DAY
Qty: 90 TABLET | Refills: 3 | Status: ACTIVE | COMMUNITY
Start: 2023-03-28

## 2024-03-29 RX ORDER — AMLODIPINE BESYLATE AND BENAZEPRIL HYDROCHLORIDE 5; 20 MG/1; MG/1
TAKE 1 CAPSULE BY ORAL ROUTE ONCE DAILY CAPSULE ORAL 1
Qty: 0 | Refills: 0 | Status: ACTIVE | COMMUNITY
Start: 1900-01-01

## 2024-03-29 RX ORDER — MONTELUKAST SODIUM 10 MG/1
TAKE 1 TABLET (10 MG) BY ORAL ROUTE ONCE DAILY IN THE EVENING TABLET, FILM COATED ORAL 1
Qty: 0 | Refills: 0 | COMMUNITY
Start: 1900-01-01

## 2024-03-29 RX ORDER — ZOLPIDEM TARTRATE 10 MG/1
TABLET, FILM COATED ORAL
Qty: 0 | Refills: 0 | Status: ACTIVE | COMMUNITY
Start: 1900-01-01

## 2024-03-29 RX ORDER — MONTELUKAST SODIUM 10 MG/1
TAKE 1 TABLET (10 MG) BY ORAL ROUTE ONCE DAILY IN THE EVENING TABLET, FILM COATED ORAL 1
Qty: 0 | Refills: 0 | Status: ACTIVE | COMMUNITY
Start: 1900-01-01

## 2024-03-29 RX ORDER — DICLOFENAC SODIUM 75 MG/1
TAKE 1 TABLET (75 MG) BY ORAL ROUTE 2 TIMES PER DAY TABLET, DELAYED RELEASE ORAL 2
Qty: 0 | Refills: 0 | Status: ACTIVE | COMMUNITY
Start: 1900-01-01

## 2024-03-29 RX ORDER — METOPROLOL SUCCINATE 100 MG/1
TABLET, EXTENDED RELEASE ORAL
Qty: 0 | Refills: 0 | COMMUNITY
Start: 1900-01-01

## 2024-03-29 RX ORDER — LEVOTHYROXINE SODIUM 50 UG/1
TABLET ORAL
Qty: 0 | Refills: 0 | Status: ACTIVE | COMMUNITY
Start: 1900-01-01

## 2024-03-29 RX ORDER — PANTOPRAZOLE SODIUM 40 MG/1
1 TABLET TABLET, DELAYED RELEASE ORAL ONCE A DAY
Qty: 90 TABLET | Refills: 3 | COMMUNITY
Start: 2023-03-28

## 2024-03-29 RX ORDER — PANTOPRAZOLE SODIUM 40 MG/1
GRANULE, DELAYED RELEASE ORAL
Qty: 0 | Refills: 0 | Status: ACTIVE | COMMUNITY
Start: 1900-01-01

## 2024-03-29 RX ORDER — METOPROLOL SUCCINATE 100 MG/1
TABLET, EXTENDED RELEASE ORAL
Qty: 0 | Refills: 0 | Status: ACTIVE | COMMUNITY
Start: 1900-01-01

## 2024-03-29 RX ORDER — HYDROCORTISONE ACETATE 25 MG/1
1 SUPPOSITORY SUPPOSITORY RECTAL TWICE A DAY
Qty: 60 | Refills: 0 | COMMUNITY
Start: 2020-07-31

## 2024-03-29 RX ORDER — DICLOFENAC SODIUM 75 MG/1
TAKE 1 TABLET (75 MG) BY ORAL ROUTE 2 TIMES PER DAY TABLET, DELAYED RELEASE ORAL 2
Qty: 0 | Refills: 0 | COMMUNITY
Start: 1900-01-01

## 2024-03-29 RX ORDER — PANTOPRAZOLE SODIUM 40 MG/1
1 TABLET TABLET, DELAYED RELEASE ORAL ONCE A DAY
Qty: 90 TABLET | Refills: 3 | OUTPATIENT

## 2024-03-29 RX ORDER — PANTOPRAZOLE SODIUM 40 MG/1
GRANULE, DELAYED RELEASE ORAL
Qty: 0 | Refills: 0 | COMMUNITY
Start: 1900-01-01

## 2024-03-29 RX ORDER — HYDROCORTISONE ACETATE 25 MG/1
1 SUPPOSITORY SUPPOSITORY RECTAL TWICE A DAY
Qty: 60 | Refills: 0 | Status: ACTIVE | COMMUNITY
Start: 2020-07-31

## 2024-03-29 RX ORDER — LEVOTHYROXINE SODIUM 50 UG/1
TABLET ORAL
Qty: 0 | Refills: 0 | COMMUNITY
Start: 1900-01-01

## 2024-03-29 RX ORDER — ZOLPIDEM TARTRATE 10 MG/1
TABLET, FILM COATED ORAL
Qty: 0 | Refills: 0 | COMMUNITY
Start: 1900-01-01

## 2024-03-29 NOTE — HPI-TODAY'S VISIT:
This is a 74yoF who now presents for follow-up.    She  has a hx of CIC and takes miralax every 2 days as daily capful was too much but still some days with harder stools. Hx of fissure--saw Dr. Orr and is s/p sphincterotomy and fissurectomy. No rectal pain and rare BRB, scant on TP and with straining. No rectal pain or itching. Linzess caused cramps and diarrhea and amitiza did not help.  She underwent a colonoscopy on 02/21/2020 that demonstrated 7 tubular adenomas, diverticulosis in the right colon, and external/internal hemorrhoids.  She has a family history with her mother, aunt, and grandfather who had colorectal malignancy. Repeat colon 3/2/23 showed IH, 3 4-6mm adenomas and diverticulosis of sigmoid and ascending.   She has intermittent dysphagia and underwent EGD on 02/21/2020 which showed normal esophagus which was empirically dilated.  She was found to have H pylori gastritis.  She was treated with triple therapy and had eradication testing 4/2022 which confirmed eradication.  She is taking her PPI every AM and denies dysphagia, odynophagia and N/V. She has rare indigestion.

## 2024-03-29 NOTE — HPI-TODAY'S VISIT:
This is a 74yoF who now presents for follow-up.    She was seen in April 2022 with intermittent rectal bleeding with associated rectal soreness X 1 year. She  has a hx of CIC and advised to start daily miralax 2' straining . She had a fissure on exam, given nifedipine/lido and referred to Dr. Orr and is s/p sphincterotomy and fissurectomy. She notes her rectal pain has resolved but still with some intermittent BRBPR, mostly on TP. She is using miralax daily and having daily BMs.  Linzess caused cramps and diarrhea and amitiza did not help.  She underwent a colonoscopy on 02/21/2020 that demonstrated 7 tubular adenomas, diverticulosis in the right colon, and external/internal hemorrhoids.  She has a family history with her mother, aunt, and grandfather who had colorectal malignancy. Repeat colon 3/2/23 showed IH, 3 4-6mm adenomas and diverticulosis of sigmoid and ascending.   She has intermittent dysphagia and underwent EGD on 02/21/2020 which showed normal esophagus which was empirically dilated.  She was found to have H pylori gastritis.  She was treated with triple therapy and had eradication testing 4/2022 which confirmed eradication.  She is taking her PPI every AM and denies GERD, dysphagia, odynophagia and N/V  [place]

## 2025-03-27 ENCOUNTER — DASHBOARD ENCOUNTERS (OUTPATIENT)
Age: 75
End: 2025-03-27

## 2025-03-28 ENCOUNTER — OFFICE VISIT (OUTPATIENT)
Dept: URBAN - METROPOLITAN AREA TELEHEALTH 2 | Facility: TELEHEALTH | Age: 75
End: 2025-03-28
Payer: MEDICARE

## 2025-03-28 ENCOUNTER — TELEPHONE ENCOUNTER (OUTPATIENT)
Dept: URBAN - METROPOLITAN AREA CLINIC 94 | Facility: CLINIC | Age: 75
End: 2025-03-28

## 2025-03-28 DIAGNOSIS — K57.90 DIVERTICULOSIS: ICD-10-CM

## 2025-03-28 DIAGNOSIS — K29.70 HELICOBACTER POSITIVE GASTRITIS: ICD-10-CM

## 2025-03-28 DIAGNOSIS — Z86.0101 HISTORY OF ADENOMATOUS POLYP OF COLON: ICD-10-CM

## 2025-03-28 DIAGNOSIS — K21.9 GERD WITHOUT ESOPHAGITIS: ICD-10-CM

## 2025-03-28 DIAGNOSIS — Z80.0 FAMILY HISTORY OF COLON CANCER: ICD-10-CM

## 2025-03-28 DIAGNOSIS — K59.09 CHRONIC CONSTIPATION: ICD-10-CM

## 2025-03-28 PROCEDURE — 99213 OFFICE O/P EST LOW 20 MIN: CPT | Performed by: PHYSICIAN ASSISTANT

## 2025-03-28 RX ORDER — DICLOFENAC SODIUM 75 MG/1
TAKE 1 TABLET (75 MG) BY ORAL ROUTE 2 TIMES PER DAY TABLET, DELAYED RELEASE ORAL 2
Qty: 0 | Refills: 0 | Status: ACTIVE | COMMUNITY
Start: 1900-01-01

## 2025-03-28 RX ORDER — METOPROLOL SUCCINATE 100 MG/1
TABLET, EXTENDED RELEASE ORAL
Qty: 0 | Refills: 0 | Status: ACTIVE | COMMUNITY
Start: 1900-01-01

## 2025-03-28 RX ORDER — AMLODIPINE BESYLATE AND BENAZEPRIL HYDROCHLORIDE 5; 20 MG/1; MG/1
TAKE 1 CAPSULE BY ORAL ROUTE ONCE DAILY CAPSULE ORAL 1
Qty: 0 | Refills: 0 | Status: ACTIVE | COMMUNITY
Start: 1900-01-01

## 2025-03-28 RX ORDER — PANTOPRAZOLE SODIUM 40 MG/1
GRANULE, DELAYED RELEASE ORAL
Qty: 0 | Refills: 0 | Status: ACTIVE | COMMUNITY
Start: 1900-01-01

## 2025-03-28 RX ORDER — HYDROCORTISONE ACETATE 25 MG/1
1 SUPPOSITORY SUPPOSITORY RECTAL TWICE A DAY
Qty: 60 | Refills: 0 | Status: ACTIVE | COMMUNITY
Start: 2020-07-31

## 2025-03-28 RX ORDER — PANTOPRAZOLE SODIUM 40 MG/1
1 TABLET TABLET, DELAYED RELEASE ORAL ONCE A DAY
Qty: 90 TABLET | Refills: 3 | OUTPATIENT
Start: 2025-03-26

## 2025-03-28 RX ORDER — ZOLPIDEM TARTRATE 10 MG/1
TABLET, FILM COATED ORAL
Qty: 0 | Refills: 0 | Status: ACTIVE | COMMUNITY
Start: 1900-01-01

## 2025-03-28 RX ORDER — PANTOPRAZOLE SODIUM 40 MG/1
1 TABLET TABLET, DELAYED RELEASE ORAL ONCE A DAY
Qty: 90 TABLET | Refills: 3 | Status: ACTIVE | COMMUNITY

## 2025-03-28 RX ORDER — LEVOTHYROXINE SODIUM 50 UG/1
TABLET ORAL
Qty: 0 | Refills: 0 | Status: ACTIVE | COMMUNITY
Start: 1900-01-01

## 2025-03-28 RX ORDER — MONTELUKAST SODIUM 10 MG/1
TAKE 1 TABLET (10 MG) BY ORAL ROUTE ONCE DAILY IN THE EVENING TABLET, FILM COATED ORAL 1
Qty: 0 | Refills: 0 | Status: ACTIVE | COMMUNITY
Start: 1900-01-01

## 2025-03-28 NOTE — HPI-TODAY'S VISIT:
This is a 75yoF who now presents for follow-up.    She was seen in April 2022 with intermittent rectal bleeding with associated rectal soreness X 1 year. She  has a hx of CIC and advised to start daily miralax 2' straining . She had a fissure on exam, given nifedipine/lido and referred to Dr. Orr and is s/p sphincterotomy and fissurectomy. Rectal pain resolved--she was using miralax daily and having daily BMs but using prn now and with this persistent constipation. When she uses daily she thinks it increases her gas.  Linzess caused cramps and diarrhea and amitiza did not help.  She underwent a colonoscopy on 02/21/2020 that demonstrated 7 tubular adenomas, diverticulosis in the right colon, and external/internal hemorrhoids.  She has a family history with her mother, aunt, and grandfather who had colorectal malignancy. Repeat colon 3/2/23 showed IH, 3 4-6mm adenomas and diverticulosis of sigmoid and ascending. Her sister recently had large polyps requiring surgery.  She has intermittent dysphagia and underwent EGD on 02/21/2020 which showed normal esophagus which was empirically dilated.  She was found to have H pylori gastritis.  She was treated with triple therapy and had eradication testing 4/2022 which confirmed eradication.  She is taking her PPI every AM and denies GERD, dysphagia, odynophagia and N/V. She sees a PCP for labs.